# Patient Record
Sex: FEMALE | URBAN - METROPOLITAN AREA
[De-identification: names, ages, dates, MRNs, and addresses within clinical notes are randomized per-mention and may not be internally consistent; named-entity substitution may affect disease eponyms.]

---

## 2017-05-24 ENCOUNTER — OFFICE VISIT (OUTPATIENT)
Dept: OBGYN | Facility: CLINIC | Age: 42
End: 2017-05-24
Payer: COMMERCIAL

## 2017-05-24 ENCOUNTER — RESULT FOLLOW UP (OUTPATIENT)
Dept: OBGYN | Facility: CLINIC | Age: 42
End: 2017-05-24

## 2017-05-24 VITALS
BODY MASS INDEX: 28.12 KG/M2 | DIASTOLIC BLOOD PRESSURE: 90 MMHG | HEART RATE: 69 BPM | TEMPERATURE: 98.6 F | SYSTOLIC BLOOD PRESSURE: 139 MMHG | WEIGHT: 169 LBS

## 2017-05-24 DIAGNOSIS — Z12.4 CERVICAL CANCER SCREENING: ICD-10-CM

## 2017-05-24 DIAGNOSIS — Z98.890 S/P LEEP OF CERVIX: ICD-10-CM

## 2017-05-24 DIAGNOSIS — N89.8 VAGINAL DISCHARGE: ICD-10-CM

## 2017-05-24 DIAGNOSIS — L73.2 HIDRADENITIS SUPPURATIVA: ICD-10-CM

## 2017-05-24 DIAGNOSIS — Z01.411 ENCOUNTER FOR GYNECOLOGICAL EXAMINATION WITH ABNORMAL FINDING: Primary | ICD-10-CM

## 2017-05-24 DIAGNOSIS — Z12.31 ENCOUNTER FOR SCREENING MAMMOGRAM FOR BREAST CANCER: ICD-10-CM

## 2017-05-24 LAB
MICRO REPORT STATUS: NORMAL
SPECIMEN SOURCE: NORMAL
WET PREP SPEC: NORMAL

## 2017-05-24 PROCEDURE — G0124 SCREEN C/V THIN LAYER BY MD: HCPCS | Performed by: OBSTETRICS & GYNECOLOGY

## 2017-05-24 PROCEDURE — 87210 SMEAR WET MOUNT SALINE/INK: CPT | Performed by: OBSTETRICS & GYNECOLOGY

## 2017-05-24 PROCEDURE — 99396 PREV VISIT EST AGE 40-64: CPT | Performed by: OBSTETRICS & GYNECOLOGY

## 2017-05-24 PROCEDURE — G0145 SCR C/V CYTO,THINLAYER,RESCR: HCPCS | Performed by: OBSTETRICS & GYNECOLOGY

## 2017-05-24 PROCEDURE — 87624 HPV HI-RISK TYP POOLED RSLT: CPT | Performed by: OBSTETRICS & GYNECOLOGY

## 2017-05-24 RX ORDER — CLINDAMYCIN PHOSPHATE 10 MG/G
GEL TOPICAL 2 TIMES DAILY
Qty: 60 G | Refills: 11 | Status: SHIPPED | OUTPATIENT
Start: 2017-05-24

## 2017-05-24 NOTE — LETTER
October 5, 2018      Kendal Navarrete  [address] [place] [address]    Dear ,      At [facility], your health and wellness is our primary concern. That is why we are following up on a colposcopy from 10/17/17, which was reported as focal atypia of undetermined significance, no LILIA. Your provider had recommended that you have a Pap smear and HPV test completed by 10/17/18. Our records do not show that this has been scheduled.    It is important to complete the follow up that your provider has suggested for you to ensure that there are no worsening changes which may, over time, develop into cancer.      Please contact our office at  [phone] to schedule an appointment for a Pap smear and HPV test at your earliest convenience. If you have questions or concerns, please call the clinic and we will be happy to assist you.    If you have completed the tests outside of [facility], please have the results forwarded to our office. We will update the chart for your primary Physician to review before your next annual physical.     Thank you for choosing [facility]!    Sincerely,      Libby Campos MD/feliciano

## 2017-05-24 NOTE — LETTER
November 2, 2018      Kendal Navarrete  [address] [place] [address]    Dear ,      At [facility], your health and wellness is our primary concern. That is why we are following up on a colposcopy from 10/17/17, which was reported as focal atypia of undetermined significance, no LILIA. Your provider had recommended that you have a Pap smear and HPV test completed by 10/17/18. Our records do not show that this has been scheduled.    It is important to complete the follow up that your provider has suggested for you to ensure that there are no worsening changes which may, over time, develop into cancer.      Please contact our office at  [phone] to schedule an appointment for a Pap smear and HPV test at your earliest convenience. If you have questions or concerns, please call the clinic and we will be happy to assist you.    If you have completed the tests outside of [facility], please have the results forwarded to our office. We will update the chart for your primary Physician to review before your next annual physical.     Thank you for choosing [facility]!    Sincerely,      Libby Campos MD/feliciano

## 2017-05-24 NOTE — MR AVS SNAPSHOT
"              After Visit Summary   5/24/2017    Kendal Navarrete    MRN: [id number]           Patient Information     Date Of Birth          1975        Visit Information        Provider Department      5/24/2017 2:30 PM Libby Campos MD [facility]        Today's Diagnoses     Encounter for gynecological examination with abnormal finding    -  1    Hidradenitis suppurativa        Cervical cancer screening        Vaginal discharge        Encounter for screening mammogram for breast cancer           Follow-ups after your visit        Future tests that were ordered for you today     Open Future Orders        Priority Expected Expires Ordered    MA Screening Digital Bilateral Routine  5/24/2018 5/24/2017            Who to contact     If you have questions or need follow up information about today's clinic visit or your schedule please contact [facility] directly at [phone].  Normal or non-critical lab and imaging results will be communicated to you by MyChart, letter or phone within 4 business days after the clinic has received the results. If you do not hear from us within 7 days, please contact the clinic through MyChart or phone. If you have a critical or abnormal lab result, we will notify you by phone as soon as possible.  Submit refill requests through [employer] or call your pharmacy and they will forward the refill request to us. Please allow 3 business days for your refill to be completed.          Additional Information About Your Visit        MyChart Information     [employer]ify lets you send messages to your doctor, view your test results, renew your prescriptions, schedule appointments and more. To sign up, go to [email or web address] . Click on \"Log in\" on the left side of the screen, which will take you to the Welcome page. Then click on \"Sign up Now\" on the right side of the page.     You will be asked to enter the access code listed below, as well as some " personal information. Please follow the directions to create your username and password.     Your access code is: [email or web address]  Expires: 2017 10:01 PM     Your access code will  in 90 days. If you need help or a new code, please call your [facility] or [phone].        Care EveryWhere ID     This is your Care EveryWhere ID. This could be used by other organizations to access your [facility] medical records  [id number]        Your Vitals Were     Pulse Temperature Last Period BMI (Body Mass Index)          69 98.6  F (37  C) (Oral) 05/10/2017 28.12 kg/m2         Blood Pressure from Last 3 Encounters:   17 139/90   16 128/84   03/30/15 (!) 137/93    Weight from Last 3 Encounters:   17 169 lb (76.7 kg)   16 171 lb 6.4 oz (77.7 kg)   03/30/15 157 lb 12.8 oz (71.6 kg)              We Performed the Following     HPV High Risk Types DNA Cervical     Pap imaged thin layer screen with HPV - recommended age 30 - 65 years (select HPV order below)     Wet prep          Today's Medication Changes          These changes are accurate as of: 17 11:59 PM.  If you have any questions, ask your nurse or doctor.               Start taking these medicines.        Dose/Directions    clindamycin 1 % topical gel   Commonly known as:  CLINDAMAX   Used for:  Hidradenitis suppurativa   Started by:  Libby Campos MD        Apply topically 2 times daily   Quantity:  60 g   Refills:  11            Where to get your medicines      These medications were sent to [id number] - [place], [place] [address] AT [facility] & [address], [facility] [address]     Phone:  [phone]     clindamycin 1 % topical gel                Primary Care Provider    No Pcp Confirmed       No address on file        Thank you!     Thank you for choosing [facility]  for your care. Our goal is always to provide you with excellent care. Hearing back  from our patients is one way we can continue to improve our services. Please take a few minutes to complete the written survey that you may receive in the mail after your visit with us. Thank you!             Your Updated Medication List - Protect others around you: Learn how to safely use, store and throw away your medicines at www.disposemymeds.org.          This list is accurate as of: 5/24/17 11:59 PM.  Always use your most recent med list.                   Brand Name Dispense Instructions for use    atenolol 25 MG tablet    TENORMIN     Take 25 mg by mouth daily       clindamycin 1 % topical gel    CLINDAMAX    60 g    Apply topically 2 times daily       doxycycline 100 MG tablet    VIBRA-TABS    180 tablet    Take 1 tablet (100 mg) by mouth 2 times daily       imiquimod 5 % cream    ALDARA    12 packet    Apply a small sized amount to warts or molluscum three times weekly at bedtime.   Wash off after 8 hours.   May use for up to 16 weeks.       tretinoin 0.05 % cream    RETIN-A    45 g    Spread a pea size amount into affected area topically at bedtime.  Use sunscreen SPF>30.

## 2017-05-24 NOTE — LETTER
[address] [address] [phone]        May 25, 2017      Kendal Navarrete  [address]  [place] [facility] [address]          Dear Ms. Navarrete,    The results of your recent lab tests were within normal limits. Enclosed is a copy of these results.  If you have any further questions or problems, please contact our office at [phone].  Component      Latest Ref Rng & Units 5/24/2017   Specimen Description       Vagina   Wet Prep       No Trichomonas seen . . .   Micro Report Status       FINAL 05/24/2017     Sincerely,        Libby Campos MD/maci

## 2017-05-24 NOTE — PROGRESS NOTES
"GYN CLINIC VISIT  2017  CC: annual exam    Kendal is a 42 year old  female who presents for annual exam.     Menses are 24-25 days and normal lasting 7 days.  Menses flow: normal.  Patient's last menstrual period was 05/10/2017.. Using none for contraception.  She is currently considering pregnancy.  Besides routine health maintenance,  she would like to discuss the followin. Breast cyst. Under right breast. Have been the same for a few years.   2. Cysts in groin. Started about 2-3 years ago. Gets them randomly. They sometimes hurt if they get big. They usually drain spontaneously - purulent fluid.  3. Vaginal discharge. \"slimy drainage\" denies any risk for STIs, declines testing. Unsure if odor. Denies itching or irritation.     Takes atenolol PRN for hypertension, managed by Dr. Recinos at [facility]    GYNECOLOGIC HISTORY:  Menarche: 13  Age at first intercourse: 18 Number of lifetime partners: more than 6  Kendal is sexually active with 1male partner(s) and is currently in monogamous relationship with partner.    History sexually transmitted infections: HPV  STI testing offered?  Declined  ANABEL exposure: No  History of abnormal Pap smear: YES - updated in Problem List and Health Maintenance accordingly  Family history of breast CA: No  Family history of uterine/ovarian CA: No  Family history of colon CA: No    HEALTH MAINTENANCE:  Cholesterol: (No results found for: CHOL History of abnormal lipids: No  Mammo: yes . History of abnormal Mammo: No.  Regular Self Breast Exams: Yes  Calcium/Vitamin D intake: source:  dairy, Veggies Adequate? Yes  TSH: (No results found for: TSH )  Pap; (  Lab Results   Component Value Date    PAP NIL 2015    PAP NIL 10/13/2008    PAP NIL 2007    )    HISTORY:  Obstetric History       T1      L0     SAB1   TAB0   Ectopic0   Multiple0   Live Births0       # Outcome Date GA Lbr Grant/2nd Weight Sex Delivery Anes PTL Lv   2 " TAB 10/20/03 7w0d    TAB   DEC   1 Term 95 40w0d               Past Medical History:   Diagnosis Date     Essential hypertension, benign      Severe dysplasia of cervix (LILIA III)     leep done, extends to endocervical margin     Past Surgical History:   Procedure Laterality Date     C NONSPECIFIC PROCEDURE  3/31/95    Vaginal delivery      LEEP TX, CERVICAL      LILIA III     Family History   Problem Relation Age of Onset     Hypertension Maternal Grandmother      Hypertension Mother      CEREBROVASCULAR DISEASE Paternal Grandmother      HEART DISEASE Maternal Grandmother      DIABETES Maternal Grandmother      CANCER Father      Leukemia     Social History   Lives with  and son. Works as a . Feels safe.   Social History     Marital status: Single     Spouse name: N/A     Number of children: N/A     Years of education: N/A   Quit smoking 2015  Social History Main Topics     Smoking status: Former Smoker     Packs/day: 0.50     Years: 9.00     Types: Cigarettes     Smokeless tobacco: None     Alcohol use Yes      Comment: 2-3 drinks per week     Drug use: No     Sexual activity: Yes     Partners: Male     Other Topics Concern     None     Social History Narrative    Caffeine intake/servings daily - 0    Calcium intake/servings daily - 1-2    Exercise 0 times weekly - describe     Sunscreen used - No and tries to avoid sun    Seatbelts used - Yes    Guns stored in the home - No    Self Breast Exam - Yes    Pap test up to date -  Yes    Eye exam up to date -  No    Dental exam up to date -  Yes    DEXA scan up to date -  Not Applicable    Flex Sig/Colonoscopy up to date -  Not Applicable    Mammography up to date -  Not Applicable    Immunizations reviewed and up to date - No    Abuse: Current or Past (Physical, Sexual or Emotional) - No    Do you feel safe in your environment - Yes    Do you cope well with stress - Yes    Do you suffer from insomnia - No    Last updated by:  Queenie Sanders  10/13/2008    Queenie Sanders M.A.                       Current Outpatient Prescriptions:      imiquimod (ALDARA) 5 % cream, Apply a small sized amount to warts or molluscum three times weekly at bedtime.   Wash off after 8 hours.   May use for up to 16 weeks., Disp: 12 packet, Rfl: 3     atenolol (TENORMIN) 25 MG tablet, Take 25 mg by mouth daily, Disp: , Rfl:      tretinoin (RETIN-A) 0.05 % cream, Spread a pea size amount into affected area topically at bedtime.  Use sunscreen SPF>30., Disp: 45 g, Rfl: 11     doxycycline (VIBRA-TABS) 100 MG tablet, Take 1 tablet (100 mg) by mouth 2 times daily, Disp: 180 tablet, Rfl: 0   No Known Allergies    Past medical, surgical, social and family history were reviewed and updated in EPIC.    ROS:   C:     NEGATIVE for fever, chills, change in weight  I:       NEGATIVE for worrisome rashes, moles or lesions  E:     NEGATIVE for vision changes or irritation  E/M: NEGATIVE for ear, mouth and throat problems  R:     NEGATIVE for significant cough or SOB  CV:   NEGATIVE for chest pain, palpitations or peripheral edema  GI:     NEGATIVE for nausea, abdominal pain, heartburn, or change in bowel habits  :   NEGATIVE for frequency, dysuria, hematuria, vaginal discharge, or irregular bleeding  M:     NEGATIVE for significant arthralgias or myalgia  N:      NEGATIVE for weakness, dizziness or paresthesias  E:      NEGATIVE for temperature intolerance, skin/hair changes  P:      NEGATIVE for changes in mood or affect.    EXAM:  /90  Pulse 69  Temp 98.6  F (37  C) (Oral)  Wt 169 lb (76.7 kg)  [facility] 05/10/2017  BMI 28.12 kg/m2   BMI: Body mass index is 28.12 kg/(m^2).  Constitutional: healthy, alert and no distress  Head: Normocephalic. No masses, lesions, tenderness or abnormalities  Neck: Neck supple. Trachea midline. No adenopathy. Thyroid symmetric, normal size.   Cardiovascular: RRR. Normal S1 and S2  Respiratory: Negative.   Breast: Breasts reveal mild  symmetric fibrocystic densities bilaterally R>L, but there are no dominant, discrete, fixed or suspicious masses found.  Gastrointestinal: Abdomen soft, non-tender, non-distended. No masses, organomegaly.  :  Vulva:  No external lesions, normal female hair distribution, no inguinal adenopathy.  Inclusion cyst left inner labia majora approx 5mm in diameter. Scarring and 2 small cystic lesions in bilateral groin consistent with hidradenitis suppurativa  Urethra:  Midline, non-tender, well supported, no discharge  Vagina:  Moist, pink, no abnormal discharge, no lesions  Uterus:  Normal size , non-tender, freely mobile  Ovaries:  No masses appreciated, non-tender, mobile  Rectal Exam: deferred  Musculoskeletal: extremities normal  Skin: no suspicious lesions or rashes  Psychiatric: Affect appropriate, cooperative,mentation appears normal.     COUNSELING:   Reviewed preventive health counseling, as reflected in patient instructions       Regular exercise       Healthy diet/nutrition       Family planning       Breast Cancer screening   reports that she has quit smoking. Her smoking use included Cigarettes. She has a 4.50 pack-year smoking history. She does not have any smokeless tobacco history on file.    Body mass index is 28.12 kg/(m^2).  Weight management plan: Discussed healthy diet and exercise guidelines and patient will follow up in 12 months in clinic to re-evaluate.  FRAX Risk Assessment    ASSESSMENT:  42 year old female with satisfactory annual exam    1. Encounter for routine gynecological examination    2. Hidradenitis suppurativa  - clindamycin (CLINDAMAX) 1 % topical gel; Apply topically 2 times daily  Dispense: 60 g; Refill: 11    3. Cervical cancer screening  S/p LEEP in 2003 for LILIA 2-3 with positive margins. Noncompliant with follow up. Pap in 2006 showed ASCUS, neg HPV, colp was neg. NIL paps in 2007 and 2008. 3/30/15 NIL pap with neg HPV, recommendation was cotesting in 12 months. cotesting done  today. If NIL pap with neg HPV again, reocmmend cotesting in 3 years. If either pap or HPV abnormal, recommend colposcopy.  - Pap imaged thin layer screen with HPV - recommended age 30 - 65 years (select HPV order below)  - HPV High Risk Types DNA Cervical    4. Vaginal discharge  - Wet prep: neg    5. Encounter for screening mammogram for breast cancer  - MA Screening Digital Bilateral; Future      Libby Campos MD

## 2017-05-31 LAB
COPATH REPORT: ABNORMAL
PAP: ABNORMAL

## 2017-06-01 LAB
FINAL DIAGNOSIS: NORMAL
HPV HR 12 DNA CVX QL NAA+PROBE: NEGATIVE
HPV16 DNA SPEC QL NAA+PROBE: NEGATIVE
HPV18 DNA SPEC QL NAA+PROBE: NEGATIVE
SPECIMEN DESCRIPTION: NORMAL

## 2017-06-01 NOTE — PROGRESS NOTES
9/22/03: [facility] - LILIA II & III,  10/23/03: LEEP - LILIA II & III, extends to endocervical margins  Non-compliant f/u.   2/23/06: ASCUS, neg HPV, colp - WNL  NIL pap: 5/2007, 10/2008  3/30/15: NIL pap, neg HPV. Plan cotest pap & HPV in 1 year  05/24/17: Ascus pap, Neg HR HPV result. Plan [facility] per provider due by 08/24/17.  06/02/17: I called the pt and left a message for the pt to call me back. Pt returned the call and was advised of the pap and HPV results and given the recommendation for a Gattman.  10/17/17: Gattman focal atypia of undetermined significance, no LILIA. Plan cotest in 1 year.   10/23/17: Msg left to call back.   10/26/17: Msg left to call back.   10/31/17: Msg left to call back. Pt was advised.  10/5/18 Cotest reminder letter sent (rlm)  11/2/18 Reminder call placed, Call kept disconnecting, additional reminder letter sent (rlm)  11/16/18 Patient is lost to pap tracking follow-up. FYI routed to provider. (grecia)

## 2017-10-17 ENCOUNTER — OFFICE VISIT (OUTPATIENT)
Dept: OBGYN | Facility: CLINIC | Age: 42
End: 2017-10-17
Payer: COMMERCIAL

## 2017-10-17 VITALS
HEART RATE: 66 BPM | WEIGHT: 158 LBS | SYSTOLIC BLOOD PRESSURE: 139 MMHG | DIASTOLIC BLOOD PRESSURE: 89 MMHG | TEMPERATURE: 99 F | BODY MASS INDEX: 26.29 KG/M2

## 2017-10-17 DIAGNOSIS — Z98.890 S/P LEEP OF CERVIX: ICD-10-CM

## 2017-10-17 DIAGNOSIS — R87.610 PAPANICOLAOU SMEAR OF CERVIX WITH ATYPICAL SQUAMOUS CELLS OF UNDETERMINED SIGNIFICANCE (ASC-US): Primary | ICD-10-CM

## 2017-10-17 DIAGNOSIS — Z13.9 SCREENING PROCEDURE: ICD-10-CM

## 2017-10-17 LAB — BETA HCG QUAL IFA URINE: NEGATIVE

## 2017-10-17 PROCEDURE — 57455 BIOPSY OF CERVIX W/SCOPE: CPT | Performed by: OBSTETRICS & GYNECOLOGY

## 2017-10-17 PROCEDURE — 88305 TISSUE EXAM BY PATHOLOGIST: CPT | Performed by: OBSTETRICS & GYNECOLOGY

## 2017-10-17 PROCEDURE — 84703 CHORIONIC GONADOTROPIN ASSAY: CPT | Performed by: OBSTETRICS & GYNECOLOGY

## 2017-10-17 NOTE — PROGRESS NOTES
GYN CLINIC VISIT  10/17/2017   CC: colposcopy    HPI: Kendal Navarrete is a 42 year old female  who presents for initial colposcopy, referred by Libby Campos. Pap smear on 17 showed: ASCUS and with no high risk HPV detectable. The prior pap showed normal.     03: [facility] - LILIA II & III,  10/23/03: LEEP - LILIA II & III, extends to endocervical margins  Non-compliant f/u.   06: ASCUS, neg HPV, colp - WNL  NIL pap: 2007, 10/2008  3/30/15: NIL pap, neg HPV. Plan cotest pap & HPV in 1 year  17: Ascus pap, Neg HR HPV result. Plan [facility] per provider visit note.    Patient's last menstrual period was 2017.  UPT today is negative  Patient does not smoke  Type of contraception: none  Age at first sexual intercourse: 18  Number of sexual partners (lifetime): 6-7  Past GYN history: warts  Prior cervical/vaginal disease: LILIA 3.  Prior cervical treatment: LEEP.    Vitals:    10/17/17 1606   BP: 139/89   Pulse: 66   Temp: 99  F (37.2  C)   TempSrc: Oral   Weight: 158 lb (71.7 kg)       PROCEDURE:  Before the procedure, it was ensured that the patient was educated regarding the nature of her findings to date, the implications, and what was to be done. She has been made aware of the role of HPV, the natural history of infection, ways to minimize her future risk, the effect of HPV on the cervix, and treatment options available should they be indicated. The details of the colposcopic procedure were reviewed. All questions were answered before proceeding, and informed consent was therefore obtained.    Speculum placed in vagina and excellent visualization of cervix acheived, cervix swabbed x 3 with acetic acid solution. Lugol's also applied      FINDINGS:  Cervix: wide cervix s/p LEEP, acetowhitening noted at 12 and 7 o'clock.   SCJ seen?: yes   ECC done?: No  Satisfactory examination?: yes    Representative biopsies taking at 12 and 7 o'clock. Monsel's applied to achieve hemostasis. Patient tolerated  procedure well.     ASSESSMENT: LILIA 1.  PLAN: specimens labelled and sent to Pathology, will base further treatment on Pathology findings, treatment options discussed with patient, post biopsy instructions given to patient and call to discuss Pathology results      Libby Campos MD

## 2017-10-17 NOTE — MR AVS SNAPSHOT
"              After Visit Summary   10/17/2017    Kendal Navarrete    MRN: [id number]           Patient Information     Date Of Birth          1975        Visit Information        Provider Department      10/17/2017 3:45 PM Libby Campos MD; RD PROC [facility] [place]        Today's Diagnoses     Papanicolaou smear of cervix with atypical squamous cells of undetermined significance (ASC-US)    -  1    S/P LEEP of cervix        Screening procedure           Follow-ups after your visit        Who to contact     If you have questions or need follow up information about today's clinic visit or your schedule please contact [facility] [place] directly at [phone].  Normal or non-critical lab and imaging results will be communicated to you by MyChart, letter or phone within 4 business days after the clinic has received the results. If you do not hear from us within 7 days, please contact the clinic through MyChart or phone. If you have a critical or abnormal lab result, we will notify you by phone as soon as possible.  Submit refill requests through [employer] or call your pharmacy and they will forward the refill request to us. Please allow 3 business days for your refill to be completed.          Additional Information About Your Visit        MyChart Information     [employer] lets you send messages to your doctor, view your test results, renew your prescriptions, schedule appointments and more. To sign up, go to [email or web address] . Click on \"Log in\" on the left side of the screen, which will take you to the Welcome page. Then click on \"Sign up Now\" on the right side of the page.     You will be asked to enter the access code listed below, as well as some personal information. Please follow the directions to create your username and password.     Your access code is: [email or web address]  Expires: 1/15/2018 11:13 PM     Your access code will  in 90 days. If you need help or a new " code, please call your [facility] clinic or [phone].        Care EveryWhere ID     This is your Care EveryWhere ID. This could be used by other organizations to access your [facility] medical records  [id number]        Your Vitals Were     Pulse Temperature Last Period BMI (Body Mass Index)          66 99  F (37.2  C) (Oral) 09/02/2017 26.29 kg/m2         Blood Pressure from Last 3 Encounters:   10/17/17 139/89   05/24/17 139/90   02/23/16 128/84    Weight from Last 3 Encounters:   10/17/17 158 lb (71.7 kg)   05/24/17 169 lb (76.7 kg)   02/23/16 171 lb 6.4 oz (77.7 kg)              We Performed the Following     Beta HCG qual IFA urine - [facility] and [place]     COLP CERVIX/UPPER VAGINA W BX CERVIX     Surgical pathology exam        Primary Care Provider    None Specified       No primary provider on file.        Equal Access to Services     [facility] : Hadii enio Franco, waaxda lumelissa, qaybta kaalmada vilma, cassius rico . So [facility] [phone].    ATENCIÓN: Si habla español, tiene a cardoso disposición servicios gratuitos de asistencia lingüística. Hilaria al [phone].    We comply with applicable federal civil rights laws and [place] laws. We do not discriminate on the basis of race, color, national origin, age, disability, sex, sexual orientation, or gender identity.            Thank you!     Thank you for choosing [facility]  for your care. Our goal is always to provide you with excellent care. Hearing back from our patients is one way we can continue to improve our services. Please take a few minutes to complete the written survey that you may receive in the mail after your visit with us. Thank you!             Your Updated Medication List - Protect others around you: Learn how to safely use, store and throw away your medicines at www.disposemymeds.org.          This list is accurate as of: 10/17/17 11:13 PM.  Always use your most recent med  list.                   Brand Name Dispense Instructions for use Diagnosis    atenolol 25 MG tablet    TENORMIN     Take 25 mg by mouth daily        clindamycin 1 % topical gel    CLINDAMAX    60 g    Apply topically 2 times daily    Hidradenitis suppurativa       doxycycline 100 MG tablet    VIBRA-TABS    180 tablet    Take 1 tablet (100 mg) by mouth 2 times daily    Acne       imiquimod 5 % cream    ALDARA    12 packet    Apply a small sized amount to warts or molluscum three times weekly at bedtime.   Wash off after 8 hours.   May use for up to 16 weeks.    Genital warts       tretinoin 0.05 % cream    RETIN-A    45 g    Spread a pea size amount into affected area topically at bedtime.  Use sunscreen SPF>30.    Acne

## 2017-10-20 LAB — COPATH REPORT: NORMAL

## 2017-11-10 DIAGNOSIS — Z12.31 ENCOUNTER FOR SCREENING MAMMOGRAM FOR BREAST CANCER: Primary | ICD-10-CM
